# Patient Record
(demographics unavailable — no encounter records)

---

## 2024-10-17 NOTE — PHYSICAL EXAM
[General Appearance - Alert] : alert [General Appearance - In No Acute Distress] : in no acute distress [Oropharynx] : the oropharynx was normal with no thrush [Respiration, Rhythm And Depth] : normal respiratory rhythm and effort [Heart Sounds] : normal S1 and S2 [Edema] : there was no peripheral edema [Abdomen Soft] : soft [Abdomen Tenderness] : non-tender [Musculoskeletal - Swelling] : no joint swelling [] : no rash [Oriented To Time, Place, And Person] : oriented to person, place, and time

## 2024-10-17 NOTE — HISTORY OF PRESENT ILLNESS
[FreeTextEntry1] : 23 year old male with no pertinent PMH is presenting to clinic for post exposure prophylaxis for HIV. As per the patient, he had receptive anal and oral sex with another male partner for the first time. Did not use condoms. On 10/14/24 he visited the ED to be evaluated for STD and was initiated on PEP. Mentioned he has 7 day starter kit. Had some nausea but no vomiting with the medication.

## 2024-10-17 NOTE — ASSESSMENT
[FreeTextEntry1] : #Post exposure prophylaxis for HIV #Body fluid exposure -Baseline HIV screen negative (10/14/24) -Discussed with the patient, he has to finish 28 days of Truvad+Issentress BID. -Post finishing PEP, will check for HIV according to protocol. -Advised to refrain from unprotected sex while on the PEP.  -He is interested in PrEP, to be discussed later.  #Screen for STD -Urine G/C in ed negative. Syphilis screen negative. -Collected oral/anal G/C  #HCM -Hep B Immune. Baseline Hep C screen negative -Encouraged to get Monkey Pox vaccines. -Gave Menquadfi today. Will consider menB next time.